# Patient Record
Sex: MALE | Race: WHITE | URBAN - METROPOLITAN AREA
[De-identification: names, ages, dates, MRNs, and addresses within clinical notes are randomized per-mention and may not be internally consistent; named-entity substitution may affect disease eponyms.]

---

## 2017-01-19 ENCOUNTER — HOSPITAL ENCOUNTER (OUTPATIENT)
Dept: CT IMAGING | Facility: CLINIC | Age: 57
Discharge: HOME | End: 2017-01-19

## 2017-01-19 DIAGNOSIS — C34.92 METASTATIC LUNG CANCER (METASTASIS FROM LUNG TO OTHER SITE), LEFT (H): ICD-10-CM

## 2017-02-09 ENCOUNTER — OFFICE VISIT - HEALTHEAST (OUTPATIENT)
Dept: ONCOLOGY | Facility: HOSPITAL | Age: 57
End: 2017-02-09

## 2017-02-09 ENCOUNTER — AMBULATORY - HEALTHEAST (OUTPATIENT)
Dept: INFUSION THERAPY | Facility: HOSPITAL | Age: 57
End: 2017-02-09

## 2017-02-09 DIAGNOSIS — C34.92 METASTATIC LUNG CANCER (METASTASIS FROM LUNG TO OTHER SITE), LEFT (H): ICD-10-CM

## 2017-08-01 ENCOUNTER — HOSPITAL ENCOUNTER (OUTPATIENT)
Dept: CT IMAGING | Facility: CLINIC | Age: 57
Discharge: HOME | End: 2017-08-01
Attending: INTERNAL MEDICINE

## 2017-08-01 DIAGNOSIS — C34.92 METASTATIC LUNG CANCER (METASTASIS FROM LUNG TO OTHER SITE), LEFT (H): ICD-10-CM

## 2017-08-07 ENCOUNTER — OFFICE VISIT - HEALTHEAST (OUTPATIENT)
Dept: ONCOLOGY | Facility: HOSPITAL | Age: 57
End: 2017-08-07

## 2017-08-07 ENCOUNTER — RECORDS - HEALTHEAST (OUTPATIENT)
Dept: ADMINISTRATIVE | Facility: OTHER | Age: 57
End: 2017-08-07

## 2017-08-07 DIAGNOSIS — C34.92 METASTATIC LUNG CANCER (METASTASIS FROM LUNG TO OTHER SITE), LEFT (H): ICD-10-CM

## 2017-08-07 RX ORDER — ALBUTEROL SULFATE 90 UG/1
2 AEROSOL, METERED RESPIRATORY (INHALATION) 4 TIMES DAILY
Status: SHIPPED | COMMUNITY
Start: 2017-08-07

## 2017-08-07 RX ORDER — DULOXETIN HYDROCHLORIDE 60 MG/1
60 CAPSULE, DELAYED RELEASE ORAL DAILY
Status: SHIPPED | COMMUNITY
Start: 2017-08-07

## 2018-01-29 ENCOUNTER — HOSPITAL ENCOUNTER (OUTPATIENT)
Dept: CT IMAGING | Facility: HOSPITAL | Age: 58
Setting detail: RADIATION/ONCOLOGY SERIES
Discharge: STILL ADMITTED OR ADMINISTRATIVE | End: 2018-01-29
Attending: INTERNAL MEDICINE

## 2018-01-29 ENCOUNTER — HOSPITAL ENCOUNTER (OUTPATIENT)
Dept: CT IMAGING | Facility: CLINIC | Age: 58
Discharge: HOME | End: 2018-01-29
Attending: INTERNAL MEDICINE

## 2018-01-29 DIAGNOSIS — C34.92 METASTATIC LUNG CANCER (METASTASIS FROM LUNG TO OTHER SITE), LEFT (H): ICD-10-CM

## 2018-02-02 ENCOUNTER — RECORDS - HEALTHEAST (OUTPATIENT)
Dept: LAB | Facility: CLINIC | Age: 58
End: 2018-02-02

## 2018-02-02 ENCOUNTER — RECORDS - HEALTHEAST (OUTPATIENT)
Dept: ADMINISTRATIVE | Facility: OTHER | Age: 58
End: 2018-02-02

## 2018-02-02 ENCOUNTER — OFFICE VISIT - HEALTHEAST (OUTPATIENT)
Dept: ONCOLOGY | Facility: HOSPITAL | Age: 58
End: 2018-02-02

## 2018-02-02 DIAGNOSIS — C34.92 PRIMARY MALIGNANT NEOPLASM OF LEFT LUNG METASTATIC TO OTHER SITE (H): ICD-10-CM

## 2018-02-02 LAB
BASOPHILS # BLD AUTO: 0 THOU/UL (ref 0–0.2)
BASOPHILS NFR BLD AUTO: 0 % (ref 0–2)
EOSINOPHIL # BLD AUTO: 0 THOU/UL (ref 0–0.4)
EOSINOPHIL NFR BLD AUTO: 0 % (ref 0–6)
ERYTHROCYTE [DISTWIDTH] IN BLOOD BY AUTOMATED COUNT: 13.8 % (ref 11–14.5)
HCT VFR BLD AUTO: 37.2 % (ref 40–54)
HGB BLD-MCNC: 12.6 G/DL (ref 14–18)
LYMPHOCYTES # BLD AUTO: 1 THOU/UL (ref 0.8–4.4)
LYMPHOCYTES NFR BLD AUTO: 11 % (ref 20–40)
MCH RBC QN AUTO: 30.2 PG (ref 27–34)
MCHC RBC AUTO-ENTMCNC: 33.9 G/DL (ref 32–36)
MCV RBC AUTO: 89 FL (ref 80–100)
MONOCYTES # BLD AUTO: 0.7 THOU/UL (ref 0–0.9)
MONOCYTES NFR BLD AUTO: 8 % (ref 2–10)
NEUTROPHILS # BLD AUTO: 7.3 THOU/UL (ref 2–7.7)
NEUTROPHILS NFR BLD AUTO: 81 % (ref 50–70)
PLATELET # BLD AUTO: 248 THOU/UL (ref 140–440)
PMV BLD AUTO: 10.2 FL (ref 8.5–12.5)
RBC # BLD AUTO: 4.17 MILL/UL (ref 4.4–6.2)
WBC: 9 THOU/UL (ref 4–11)

## 2018-08-09 ENCOUNTER — OFFICE VISIT - HEALTHEAST (OUTPATIENT)
Dept: ONCOLOGY | Facility: HOSPITAL | Age: 58
End: 2018-08-09

## 2018-08-09 ENCOUNTER — RECORDS - HEALTHEAST (OUTPATIENT)
Dept: ADMINISTRATIVE | Facility: OTHER | Age: 58
End: 2018-08-09

## 2018-08-09 DIAGNOSIS — C34.92 PRIMARY MALIGNANT NEOPLASM OF LEFT LUNG METASTATIC TO OTHER SITE (H): ICD-10-CM

## 2018-10-10 ENCOUNTER — RECORDS - HEALTHEAST (OUTPATIENT)
Dept: ADMINISTRATIVE | Facility: OTHER | Age: 58
End: 2018-10-10

## 2018-10-11 ENCOUNTER — HOSPITAL ENCOUNTER (OUTPATIENT)
Dept: CT IMAGING | Facility: CLINIC | Age: 58
Discharge: HOME | End: 2018-10-11

## 2018-10-11 DIAGNOSIS — Z85.118 HISTORY OF LUNG CANCER: ICD-10-CM

## 2018-10-16 ENCOUNTER — AMBULATORY - HEALTHEAST (OUTPATIENT)
Dept: ONCOLOGY | Facility: HOSPITAL | Age: 58
End: 2018-10-16

## 2018-10-16 DIAGNOSIS — R91.8 RIGHT LOWER LOBE LUNG MASS: ICD-10-CM

## 2018-10-26 ENCOUNTER — HOSPITAL ENCOUNTER (OUTPATIENT)
Dept: PET IMAGING | Facility: HOSPITAL | Age: 58
Setting detail: RADIATION/ONCOLOGY SERIES
Discharge: STILL ADMITTED OR ADMINISTRATIVE | End: 2018-10-26
Attending: INTERNAL MEDICINE

## 2018-10-26 DIAGNOSIS — R91.8 RIGHT LOWER LOBE LUNG MASS: ICD-10-CM

## 2018-10-26 LAB — GLUCOSE BLDC GLUCOMTR-MCNC: 109 MG/DL

## 2018-10-26 ASSESSMENT — MIFFLIN-ST. JEOR: SCORE: 1598.24

## 2018-12-12 ENCOUNTER — RECORDS - HEALTHEAST (OUTPATIENT)
Dept: ADMINISTRATIVE | Facility: OTHER | Age: 58
End: 2018-12-12

## 2018-12-21 ENCOUNTER — HOSPITAL ENCOUNTER (OUTPATIENT)
Dept: CT IMAGING | Facility: CLINIC | Age: 58
Discharge: HOME | End: 2018-12-21

## 2018-12-21 DIAGNOSIS — C34.92 PRIMARY MALIGNANT NEOPLASM OF LEFT LUNG METASTATIC TO OTHER SITE (H): ICD-10-CM

## 2018-12-26 ENCOUNTER — AMBULATORY - HEALTHEAST (OUTPATIENT)
Dept: ONCOLOGY | Facility: HOSPITAL | Age: 58
End: 2018-12-26

## 2019-02-08 ENCOUNTER — OFFICE VISIT - HEALTHEAST (OUTPATIENT)
Dept: ONCOLOGY | Facility: HOSPITAL | Age: 59
End: 2019-02-08

## 2019-02-08 ENCOUNTER — AMBULATORY - HEALTHEAST (OUTPATIENT)
Dept: INFUSION THERAPY | Facility: HOSPITAL | Age: 59
End: 2019-02-08

## 2019-02-08 ENCOUNTER — RECORDS - HEALTHEAST (OUTPATIENT)
Dept: ADMINISTRATIVE | Facility: OTHER | Age: 59
End: 2019-02-08

## 2019-02-08 DIAGNOSIS — C34.92 PRIMARY MALIGNANT NEOPLASM OF LEFT LUNG METASTATIC TO OTHER SITE (H): ICD-10-CM

## 2019-02-08 DIAGNOSIS — C34.91 PRIMARY MALIGNANT NEOPLASM OF RIGHT LUNG METASTATIC TO OTHER SITE (H): ICD-10-CM

## 2019-02-08 LAB
ALBUMIN SERPL-MCNC: 3.8 G/DL (ref 3.5–5)
ALP SERPL-CCNC: 98 U/L (ref 45–120)
ALT SERPL W P-5'-P-CCNC: 26 U/L (ref 0–45)
ANION GAP SERPL CALCULATED.3IONS-SCNC: 5 MMOL/L (ref 5–18)
AST SERPL W P-5'-P-CCNC: 18 U/L (ref 0–40)
BASOPHILS # BLD AUTO: 0 THOU/UL (ref 0–0.2)
BASOPHILS NFR BLD AUTO: 1 % (ref 0–2)
BILIRUB SERPL-MCNC: 0.4 MG/DL (ref 0–1)
BUN SERPL-MCNC: 14 MG/DL (ref 8–22)
CALCIUM SERPL-MCNC: 9.5 MG/DL (ref 8.5–10.5)
CHLORIDE BLD-SCNC: 103 MMOL/L (ref 98–107)
CO2 SERPL-SCNC: 30 MMOL/L (ref 22–31)
CREAT SERPL-MCNC: 0.81 MG/DL (ref 0.7–1.3)
EOSINOPHIL # BLD AUTO: 0.1 THOU/UL (ref 0–0.4)
EOSINOPHIL NFR BLD AUTO: 2 % (ref 0–6)
ERYTHROCYTE [DISTWIDTH] IN BLOOD BY AUTOMATED COUNT: 15.2 % (ref 11–14.5)
GFR SERPL CREATININE-BSD FRML MDRD: >60 ML/MIN/1.73M2
GLUCOSE BLD-MCNC: 82 MG/DL (ref 70–125)
HCT VFR BLD AUTO: 43.1 % (ref 40–54)
HGB BLD-MCNC: 14.6 G/DL (ref 14–18)
LYMPHOCYTES # BLD AUTO: 1.2 THOU/UL (ref 0.8–4.4)
LYMPHOCYTES NFR BLD AUTO: 25 % (ref 20–40)
MCH RBC QN AUTO: 29.2 PG (ref 27–34)
MCHC RBC AUTO-ENTMCNC: 33.9 G/DL (ref 32–36)
MCV RBC AUTO: 86 FL (ref 80–100)
MONOCYTES # BLD AUTO: 0.6 THOU/UL (ref 0–0.9)
MONOCYTES NFR BLD AUTO: 13 % (ref 2–10)
NEUTROPHILS # BLD AUTO: 2.7 THOU/UL (ref 2–7.7)
NEUTROPHILS NFR BLD AUTO: 59 % (ref 50–70)
PLATELET # BLD AUTO: 213 THOU/UL (ref 140–440)
PMV BLD AUTO: 9.4 FL (ref 8.5–12.5)
POTASSIUM BLD-SCNC: 4.2 MMOL/L (ref 3.5–5)
PROT SERPL-MCNC: 6.5 G/DL (ref 6–8)
RBC # BLD AUTO: 5 MILL/UL (ref 4.4–6.2)
SODIUM SERPL-SCNC: 138 MMOL/L (ref 136–145)
WBC: 4.7 THOU/UL (ref 4–11)

## 2019-02-08 RX ORDER — NAPROXEN 500 MG/1
500 TABLET ORAL 2 TIMES DAILY PRN
Status: SHIPPED | COMMUNITY
Start: 2019-02-08

## 2019-08-27 ENCOUNTER — AMBULATORY - HEALTHEAST (OUTPATIENT)
Dept: INFUSION THERAPY | Facility: HOSPITAL | Age: 59
End: 2019-08-27

## 2019-08-27 ENCOUNTER — OFFICE VISIT - HEALTHEAST (OUTPATIENT)
Dept: ONCOLOGY | Facility: HOSPITAL | Age: 59
End: 2019-08-27

## 2019-08-27 DIAGNOSIS — C34.91 PRIMARY MALIGNANT NEOPLASM OF RIGHT LUNG METASTATIC TO OTHER SITE (H): ICD-10-CM

## 2019-08-27 RX ORDER — LEVALBUTEROL TARTRATE 45 UG/1
2 AEROSOL, METERED ORAL EVERY 6 HOURS PRN
Status: SHIPPED | COMMUNITY
Start: 2019-08-27

## 2019-08-27 RX ORDER — DULOXETIN HYDROCHLORIDE 30 MG/1
30 CAPSULE, DELAYED RELEASE ORAL DAILY
Status: SHIPPED | COMMUNITY
Start: 2019-08-27

## 2019-08-27 ASSESSMENT — MIFFLIN-ST. JEOR: SCORE: 1698.04

## 2019-11-19 ENCOUNTER — RECORDS - HEALTHEAST (OUTPATIENT)
Dept: ADMINISTRATIVE | Facility: OTHER | Age: 59
End: 2019-11-19

## 2019-12-20 ENCOUNTER — HOSPITAL ENCOUNTER (OUTPATIENT)
Dept: CT IMAGING | Facility: CLINIC | Age: 59
Discharge: HOME | End: 2019-12-20

## 2019-12-20 DIAGNOSIS — Z85.118 HISTORY OF LUNG CANCER: ICD-10-CM

## 2019-12-20 LAB
CREAT BLD-MCNC: 0.9 MG/DL (ref 0.7–1.3)
GFR SERPL CREATININE-BSD FRML MDRD: >60 ML/MIN/1.73M2

## 2020-01-14 ENCOUNTER — COMMUNICATION - HEALTHEAST (OUTPATIENT)
Dept: ONCOLOGY | Facility: HOSPITAL | Age: 60
End: 2020-01-14

## 2021-05-30 VITALS — BODY MASS INDEX: 24.97 KG/M2 | WEIGHT: 179 LBS

## 2021-05-31 VITALS — BODY MASS INDEX: 24.27 KG/M2 | WEIGHT: 174 LBS

## 2021-05-31 VITALS — WEIGHT: 177 LBS | BODY MASS INDEX: 24.69 KG/M2

## 2021-05-31 NOTE — PROGRESS NOTES
[facility] Hematology and Oncology Progress Note    Patient: Clinton S Weilage  MRN: [id number]  Date of Service: 08/27/2019        Reason for Visit    Chief Complaint   Patient presents with     HE Cancer     Primary malignant neoplasm of left lung metastatic to other site       Assessment and Plan  Cancer Staging  Metastatic lung cancer (metastasis from lung to other site) (H)  Staging form: Lung, AJCC 7th Edition  - Clinical: Stage IV (TX, NX, M1b) - Signed by Mariely Garcia MD on 5/21/2014      1. 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  Overall clinically patient is doing quite well and has no complaints.  He will return in December with another CT scan.  I told him he will need to continue annual scans because of his lung cancer history as well as his smoking history.  He is getting out of prison in January and is not sure where he will be living so I did tell him that if he is not living close to our clinic we can transfer his care and send records to anywhere he needs us     2.  COPD: He needs to use inhalers and nebulizers as needed.  They have recently been changed.  Continue to follow up at the [facility] doctor for any recommendations      ECOG Performance   ECOG Performance Status: 0     Distress Assessment  Distress Assessment Score: No distress    Pain  Currently in Pain: No/denies      Problem List    1. Primary malignant neoplasm of right lung metastatic to other site (H)  CT Chest Abdomen Pelvis With Oral With IV Cont      ______________________________________________________________________________    History of Present Illness    Measurable disease: CT of the chest        Current therapy: Observation      Treatment history:Patient diagnosed in February 2014    Initial treatment with 2 cycles of carboplatin and etoposide starting March 18, 2014     He then received concurrent radiation and weekly carboplatin and Taxol which was completed June 9, 2014      History: Patient is here today for 6 month follow-up visit.  He is doing fine. Some breathing issues, which is not new. Some of his copd meds have been changed.       Pain Status  Currently in Pain: No/denies    Review of Systems    Constitutional  Constitutional (WDL): All constitutional elements are within defined limits  Fatigue: No Concerns  Fever: No Concerns  Chills: No Concerns  Weight Gain: No Concerns  Weight Loss: No Concerns  Hot flashes/Night Sweats: No Concerns  Neurosensory  Neurosensory (WDL): Exceptions to WDL  Peripheral Motor Neuropathy: Concerns(feet)  Ataxia: No Concerns  Peripheral Sensory Neuropathy: No Concerns  Confusion: No Concerns  Dizziness: No Concerns  Syncope: No Concerns  Eye   Eye Disorder (WDL): All eye disorder elements are within defined limits(glasses)  Blurred Vision: No Concerns  Dry Eye: No Concerns  Eye Pain: No Concerns  Watering Eyes: No Concerns  Ear  Ear Disorder (WDL): All ear disorder elements are within defined limits  Ear Pain: No Concerns  Tinnitus: No Concerns  Cardiovascular  Cardiovascular (WDL): All cardiovascular elements are within defined limits  Palpitations: No Concerns  Edema: No Concerns  SVT, DVT/PE: No Concerns  Chest Pain - Cardiac: No Concerns  Pulmonary  Respiratory (WDL): Within Defined Limits  Cough: No Concerns  Dyspnea: No Concerns  Hypoxia: No Concerns  Gastrointestinal  Gastrointestinal (WDL): All gastrointestinal elements are within defined limits  Anorexia: No Concerns  Nausea: No Concerns  Vomiting: No Concerns  Dehydration: No Concerns  Dysgeusia: No Concerns  Dysphagia: No Concerns  Mucositis Oral: No Concerns  Esophagitis: No Concerns  Constipation: No Concerns  Diarrhea: No Concerns  Pharyngitis: No Concerns  Dry Mouth: No Concerns  Genitourinary  Genitourinary (WDL): All genitourinary elements are within defined limits  Urinary  "Frequency: No Concerns  Urinary Retention: No Concerns  Urinary Tract Pain: No Concerns  Lymphatic  Lymph (WDL): All lymph disorder elements are within defined limits  Lymphedema: No Concerns  Lymph Node Discomfort: No Concerns  Musculoskeletal and Connective Tissue  Musculoskeletal and Connetive Tissue Disorders (WDL): All Musculoskeletal and Connetive Tissue Disorder elements are within defined limits  Arthralgia: No Concerns  Bone Pain: No Concerns  Muscle Weakness : No Concerns  Myalgia: No Concerns  Integumentary  Integumentary (WDL): All integumentary elements are within defined limits  Alopecia: No Concerns  Rash Maculo-Papular: No Concerns  Pruritus: No Concerns  Urticaria: No Concerns  Palmar-Plantar Erythrodysesthesia Syndrome: No Concerns  Flushing: No Concerns  Patient Coping  Patient Coping: Accepting;Open/discussion  Accompanied by  Accompanied by: Law Enforcment  Oral Chemo Adherence         Past History  Past Medical History:   Diagnosis Date     Anemia      COPD (chronic obstructive pulmonary disease) (H) \"for years\"     Coronary artery disease     states \"I have a couple blockages, but they are not doing anything about that until this cancer stuff is taken care of\"     GERD (gastroesophageal reflux disease) \"10-15 years\"     Hypertension \"many years\"     Lung cancer (H) 1/2014    \"spots on my adrenal and liver\"     Metastatic cancer (H)      Pancreatic cancer (H)     \"States there may have been a spot on my pancreas too\"     Substance abuse (H) no use X 10 years    drug use was marijuana       PHYSICAL EXAM:  /71   Pulse 78   Ht 5' 11\" (1.803 m)   Wt 192 lb (87.1 kg)   SpO2 95%   BMI 26.78 kg/m    GENERAL: no acute distress. Cooperative in conversation. Here with 2 guards from [employer]. No weight loss.   HEENT: pupils are equal, round and reactive. Oromucosa is clean and intact. No ulcerations or mucositis noted. No bleeding noted.  RESP: lungs are diminished bilaterally per auscultation. " Regular respiratory rate. No wheezes or rhonchi.  CV: Regular, rate and rhythm. No murmurs.  ABD: soft, nontender. Positive bowel sounds. No organomegaly.   MUSCULOSKELETAL: No lower extremity swelling.   NEURO: non focal. Alert and oriented x3.   PSYCH: within normal limits. No depression or anxiety.  SKIN: warm dry intact   LYMPH: no cervical, supraclavicular lymphadenopathy    Lab Results    No results found for this or any previous visit (from the past 168 hour(s)).    Imaging    No results found.      Signed by: Deborah Sands, CNP

## 2021-05-31 NOTE — PROGRESS NOTES
Patient is here for labs and provider for Primary malignant neoplasm of left lung metastatic to other site.

## 2021-06-01 VITALS — BODY MASS INDEX: 24.55 KG/M2 | WEIGHT: 176 LBS

## 2021-06-02 VITALS — WEIGHT: 170 LBS | BODY MASS INDEX: 23.8 KG/M2 | HEIGHT: 71 IN

## 2021-06-02 VITALS — WEIGHT: 189 LBS | BODY MASS INDEX: 26.36 KG/M2

## 2021-06-03 VITALS — WEIGHT: 192 LBS | HEIGHT: 71 IN | BODY MASS INDEX: 26.88 KG/M2

## 2021-06-05 ENCOUNTER — RECORDS - HEALTHEAST (OUTPATIENT)
Dept: SCHEDULING | Facility: CLINIC | Age: 61
End: 2021-06-05

## 2021-06-05 ENCOUNTER — RECORDS - HEALTHEAST (OUTPATIENT)
Dept: ONCOLOGY | Facility: HOSPITAL | Age: 61
End: 2021-06-05

## 2021-06-05 DIAGNOSIS — C34.90 MALIGNANT NEOPLASM OF BRONCHUS AND LUNG (H): ICD-10-CM

## 2021-06-05 DIAGNOSIS — C80.1 OTHER MALIGNANT NEOPLASM OF UNSPECIFIED SITE: ICD-10-CM

## 2021-06-05 DIAGNOSIS — R13.10 DYSPHAGIA: ICD-10-CM

## 2021-06-05 NOTE — TELEPHONE ENCOUNTER
Clinton called today to inform us he is released from [facility] on 1/9/20. He requested to cancel his appointmemt with Dr Vasquez on 1/16/20.   He is going to [place] and will be scheduling his appointments up there.   He wishes to follow DR vasquez for future care.  He will call himself when he figure his situation out.

## 2021-06-05 NOTE — TELEPHONE ENCOUNTER
Referrals department from [facility] in the [place], [place] called in and left a message on the nurse triage line regarding this patient.  This patient is currently a [employer] patient and will have to deal directly with the [employer] for information on this patient.  I called the clinic back and got a voicemail so I did leave a detailed voicemail stating that they will need to contact the [facility] directly and that he is at the [facility] correctional facility.    Margret Bonilla RN

## 2021-06-08 NOTE — PROGRESS NOTES
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  No other evidence of cancer recurrence or progression.  He is 3 years out from diagnosis.  We will continue with observation and follow-up in 6 months with repeat CT of the chest.    COPD and neuropathy are stable.    Other pulmonary nodules have resolved.    Plan: Follow-up in 6 months with repeat CT of the chest    Measurable disease: CT of the chest       Current therapy: Observation        Treatment history:Patient diagnosed in February 2014    Initial treatment with 2 cycles of carboplatin and etoposide starting March 18, 2014    He then received concurrent radiation and weekly carboplatin and Taxol which was completed June 9, 2014      Metastatic lung cancer (metastasis from lung to other site)    Staging form: Lung, AJCC 7th Edition      Clinical: Stage IV (TX, NX, M1b) - Signed by Mariely Garcia MD on 5/21/2014    ECOG Performance   ECOG Performance Status: 1    Distress Assessment       Pain  Pain Score (Initial OR Reassessment): 3        Problem List    1. Metastatic lung cancer (metastasis from lung to other site), left  CT Chest With Contrast        CC: Prosper Argueta MD    ______________________________________________________________________________    History of Present Illness    Mr. Clinton S Weilage is here for reevaluation.  He was seen 3 months ago.  He steers out now from his diagnosis.  He denies any headaches dizziness or focal weakness.  He does have chronic shortness of breath with some pain on deep inspiration.  No fever or mouth  "sores.  No change in his cough currently.  No new bone or abdominal pain.  Appetite and weight are stable.  No change in bowels or urine.  No bleeding or rash.  ECOG status is 0.    Pain Status  Currently in Pain: Yes    Review of Systems    Constitutional  Constitutional (WDL): Exceptions to WDL  Fatigue: Fatigue relieved by rest  Neurosensory  Neurosensory (WDL): Exceptions to WDL  Peripheral Sensory Neuropathy: Asymptomatic, loss of deep tendon reflexes or paresthesia ( Feet; feels worse. Reports that it feels constricting. )  Cardiovascular  Cardiovascular (WDL): All cardiovascular elements are within defined limits  Pulmonary  Respiratory (WDL): Exceptions to WDL  Dyspnea: Shortness of breath with moderate exertion  Gastrointestinal  Gastrointestinal (WDL): Exceptions to WDL  Anorexia: Loss of appetite without alteration in eating habits  Genitourinary  Genitourinary (WDL): All genitourinary elements are within defined limits  Integumentary  Integumentary (WDL): All integumentary elements are within defined limits  Patient Coping  Patient Coping: Accepting  Distress Assessment     Accompanied by  Accompanied by: Law Enforcment    Past History  Past Medical History:   Diagnosis Date     Anemia      COPD (chronic obstructive pulmonary disease) \"for years\"     Coronary artery disease     states \"I have a couple blockages, but they are not doing anything about that until this cancer stuff is taken care of\"     GERD (gastroesophageal reflux disease) \"10-15 years\"     Hypertension \"many years\"     Lung cancer 1/2014    \"spots on my adrenal and liver\"     Metastatic cancer      Pancreatic cancer     \"States there may have been a spot on my pancreas too\"     Substance abuse no use X 10 years    drug use was marijuana         Past Surgical History:   Procedure Laterality Date     HAND SURGERY Bilateral 10-12 yeasrs ago    bones removed and tendon transfer for arthritis     KNEE ARTHROSCOPY Left 20 years ago    \"took " "cartilidge out\"     LUNG SURGERY  biopsy in 3/2014       Physical Exam    Recent Vitals 2/9/2017   Height -   Weight 179 lbs   BSA (m2) -   /73   Pulse 85   Temp 97.9   Temp src 1   SpO2 98       GENERAL: Alert and oriented. Seated comfortably. In no distress.    HEAD: Atraumatic and normocephalic.  Has a full head of hair.    EYES: ANA, EOMI.  No pallor.  No icterus.    Oral cavity: no mucosal lesion or tonsillar enlargement.    NECK: supple. JVP normal.  No thyroid enlargement.    LYMPH NODES: No palpable, cervical, axillary or inguinal lymphadenopathy.    CHEST: Decreased breath sounds bilaterally.  Resonant to percussion throughout bilaterally.  Symmetrical breath movements bilaterally.    CVS: S1 and S2 are heard. Regular rate and rhythm.  No murmur or gallop or rub heard.    ABDOMEN: Soft. Not tender. Not distended.  No palpable hepatomegaly or splenomegaly.  No other mass palpable.  Bowel sounds heard.    EXTREMITIES: Warm.  No peripheral edema.    SKIN: no rash, or bruising or purpura.    CNS: Nonfocal        Lab Results    No results found for this or any previous visit (from the past 168 hour(s)).    Imaging    Ct Chest With Contrast    Result Date: 1/19/2017  CT CHEST W CONTRAST 1/19/2017 11:32 AM INDICATION: Malignant neoplasm of unspecified part of left bronchus or lung. TECHNIQUE: Routine chest. Dose reduction techniques were used. IV CONTRAST: 80 mL Omni 350. COMPARISON: 11/8/2016. FINDINGS: LUNGS AND PLEURA: Severe emphysema. A mass is again seen in the left lower lobe on series 3, image 63, measuring 1 x 1.8 cm , stable to minimally decreased in size compared to the prior exam. There is fibrosis in the surrounding area of the left lower lobe, likely related to radiation therapy. No new pulmonary nodules are identified. Small nodules previously seen in the posterior basilar segment of the left lower lobe have resolved, consistent with inflammatory process. The pleural spaces appear normal. " MEDIASTINUM: No lymphadenopathy. Calcified atherosclerotic plaque in the coronary arteries. LIMITED UPPER ABDOMEN: Benign cyst in the lower pole of the left kidney. Calcified atherosclerotic plaque in the proximal abdominal aorta. MUSCULOSKELETAL: Negative.     CONCLUSION: 1.  Left lower lobe mass appears stable to minimally decreased in size with surrounding infrahilar fibrosis. 2.  Interval resolution of small pulmonary nodules, resolution of inflammatory process.        Signed by: Mariely Garcia MD

## 2021-06-12 NOTE — PROGRESS NOTES
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  No new lesions are noted.  Patient remains in good remission and he is reassured.  Would recommend continued six-month follow-up with repeat CT of the chest.  Patient is more than 3 3 years out after completion of all treatment.    He continues on treatment for COPD and neuropathy and this is stable.      Plan: Follow-up in 6 months with repeat CT of the chest    Measurable disease: CT of the chest       Current therapy: Observation        Treatment history:Patient diagnosed in February 2014    Initial treatment with 2 cycles of carboplatin and etoposide starting March 18, 2014    He then received concurrent radiation and weekly carboplatin and Taxol which was completed June 9, 2014      Metastatic lung cancer (metastasis from lung to other site)    Staging form: Lung, AJCC 7th Edition      Clinical: Stage IV (TX, NX, M1b) - Signed by Mariely Garcia MD on 5/21/2014    ECOG Performance   ECOG Performance Status: 1    Distress Assessment  Distress Assessment Score: No distress    Pain           Problem List    1. Metastatic lung cancer (metastasis from lung to other site), left  CT Chest With Contrast        CC: Prosper Argueta MD    ______________________________________________________________________________    History of Present Illness    Mr. Clinton S Weilage is here for reevaluation.  He was seen 6 months ago.  Continues to have neuropathy symptoms.  Is on nebulizers and inhalers for his COPD.  No headaches or  "dizziness.  No shortness of breath or cough.  No new bone or abdominal pain.  Appetite and weight are stable.  ECOG status is 0.  No other new symptoms or problems.    Pain Status  Currently in Pain: No/denies    Review of Systems    Constitutional  Constitutional (WDL): Exceptions to WDL  Fatigue: Fatigue relieved by rest  Neurosensory  Neurosensory (WDL): Exceptions to WDL  Peripheral Sensory Neuropathy: Moderate symptoms, limiting instrumental ADL (Feet; feels worse. Reports that it feels constricting. On Cymbalta. )  Cardiovascular  Cardiovascular (WDL): All cardiovascular elements are within defined limits  Pulmonary  Respiratory (WDL): Exceptions to WDL  Dyspnea: Shortness of breath with moderate exertion  Gastrointestinal  Gastrointestinal (WDL): Exceptions to WDL  Anorexia: Loss of appetite without alteration in eating habits  Genitourinary  Genitourinary (WDL): All genitourinary elements are within defined limits  Integumentary  Integumentary (WDL): All integumentary elements are within defined limits  Patient Coping  Patient Coping: Accepting  Distress Assessment  Distress Assessment Score: No distress  Accompanied by  Accompanied by: Law Enforcment    Past History  Past Medical History:   Diagnosis Date     Anemia      COPD (chronic obstructive pulmonary disease) \"for years\"     Coronary artery disease     states \"I have a couple blockages, but they are not doing anything about that until this cancer stuff is taken care of\"     GERD (gastroesophageal reflux disease) \"10-15 years\"     Hypertension \"many years\"     Lung cancer 1/2014    \"spots on my adrenal and liver\"     Metastatic cancer      Pancreatic cancer     \"States there may have been a spot on my pancreas too\"     Substance abuse no use X 10 years    drug use was marijuana         Past Surgical History:   Procedure Laterality Date     HAND SURGERY Bilateral 10-12 yeasrs ago    bones removed and tendon transfer for arthritis     KNEE ARTHROSCOPY " "Left 20 years ago    \"took cartilidge out\"     LUNG SURGERY  biopsy in 3/2014       Physical Exam    Recent Vitals 8/7/2017   Height -   Weight 174 lbs   BSA (m2) -   /81   Pulse 81   Temp 98.7   Temp src 1   SpO2 97   Some recent data might be hidden       GENERAL: Alert and oriented. Seated comfortably. In no distress.    HEAD: Atraumatic and normocephalic.  Has a full head of hair.    EYES: ANA, EOMI.  No pallor.  No icterus.    Oral cavity: no mucosal lesion or tonsillar enlargement.    NECK: supple. JVP normal.  No thyroid enlargement.    LYMPH NODES: No palpable, cervical, axillary or inguinal lymphadenopathy.    CHEST: Decreased breath sounds bilaterally.  Resonant to percussion throughout bilaterally.  Symmetrical breath movements bilaterally.    CVS: S1 and S2 are heard. Regular rate and rhythm.  No murmur or gallop or rub heard.    ABDOMEN: Soft. Not tender. Not distended.  No palpable hepatomegaly or splenomegaly.  No other mass palpable.  Bowel sounds heard.    EXTREMITIES: Warm.  No peripheral edema.    SKIN: no rash, or bruising or purpura.    CNS: Nonfocal        Lab Results    No results found for this or any previous visit (from the past 168 hour(s)).    Imaging    Ct Chest With Contrast    Result Date: 8/1/2017  CT CHEST W CONTRAST 8/1/2017 11:14 AM INDICATION: fu lung cancer TECHNIQUE: Routine chest. Dose reduction techniques were used. IV CONTRAST: Iohexol (Omni) 100 mL COMPARISON: 1/19/2017 and 11/8/2016. FINDINGS: LUNGS AND PLEURA: Decreased size left lower lobe mass measures 1.5 x 0.7 cm (series 3, image 70) compared to 1.8 x 1 cm previously. Stable left upper lobe 2 mm nodule (image 49). No effusion. Emphysema. MEDIASTINUM: No adenopathy. Coronary atherosclerosis. LIMITED UPPER ABDOMEN: Tiny left renal cyst. MUSCULOSKELETAL: No metastases seen.     CONCLUSION: 1.  Decreased size left lower lobe mass. 2.  Stable left upper lobe 2 mm nodule. 3.  Coronary atherosclerosis.        Signed " by: Mariely Garcia MD

## 2021-06-15 NOTE — PROGRESS NOTES
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  No other clinical signs of progression either.  Reassured patient.  He is 4 years out now from the time of diagnosis.  We will see him back again in 6 months for recheck.  Will plan repeat CT scan in about 1 year.    Plan: As above      Measurable disease: CT of the chest       Current therapy: Observation        Treatment history:Patient diagnosed in February 2014    Initial treatment with 2 cycles of carboplatin and etoposide starting March 18, 2014    He then received concurrent radiation and weekly carboplatin and Taxol which was completed June 9, 2014      Metastatic lung cancer (metastasis from lung to other site)    Staging form: Lung, AJCC 7th Edition      Clinical: Stage IV (TX, NX, M1b) - Signed by Mariely Garcia MD on 5/21/2014    ECOG Performance   ECOG Performance Status: 1    Distress Assessment  Distress Assessment Score: 2    Pain           Problem List    1. Primary malignant neoplasm of left lung metastatic to other site          CC: Prosper Argueta MD    ______________________________________________________________________________    History of Present Illness    Mr. Clinton S Weilage is here for reevaluation.  He was seen about 6 months ago.  Currently being treated for bronchitis.  No headaches or dizziness.  No new bone or abdominal pain.  Appetite and weight are stable.  ECOG status 0.  Medications reviewed and are unchanged.  No other new symptoms or problems.      Pain Status  Currently in Pain: No/denies    Review of Systems    Constitutional  Constitutional (WDL): Exceptions to  "WDL  Fatigue: Fatigue relieved by rest  Neurosensory  Neurosensory (WDL): Exceptions to WDL  Peripheral Sensory Neuropathy: Asymptomatic, loss of deep tendon reflexes or paresthesia (numbness in feet)  Cardiovascular  Cardiovascular (WDL): All cardiovascular elements are within defined limits  Pulmonary  Respiratory (WDL): Exceptions to WDL  Cough: Mild symptoms, nonprescription intervention indicated  Dyspnea: Shortness of breath with minimal exertion, limiting instrumental ADL  Hypoxia: Decreased oxygen saturation with exercise (e.g., pulse oximeter <88%), intermittent supplemental oxygen  Gastrointestinal  Gastrointestinal (WDL): Exceptions to WDL  Anorexia: Loss of appetite without alteration in eating habits  Genitourinary  Genitourinary (WDL): All genitourinary elements are within defined limits  Integumentary  Integumentary (WDL): All integumentary elements are within defined limits  Patient Coping  Patient Coping: Accepting  Distress Assessment  Distress Assessment Score: 2  Accompanied by  Accompanied by:  Enforcment    Past History  Past Medical History:   Diagnosis Date     Anemia      COPD (chronic obstructive pulmonary disease) \"for years\"     Coronary artery disease     states \"I have a couple blockages, but they are not doing anything about that until this cancer stuff is taken care of\"     GERD (gastroesophageal reflux disease) \"10-15 years\"     Hypertension \"many years\"     Lung cancer 1/2014    \"spots on my adrenal and liver\"     Metastatic cancer      Pancreatic cancer     \"States there may have been a spot on my pancreas too\"     Substance abuse no use X 10 years    drug use was marijuana         Past Surgical History:   Procedure Laterality Date     HAND SURGERY Bilateral 10-12 yeasrs ago    bones removed and tendon transfer for arthritis     KNEE ARTHROSCOPY Left 20 years ago    \"took cartilidge out\"     LUNG SURGERY  biopsy in 3/2014       Physical Exam    Recent Vitals 2/2/2018   Height - "   Weight 177 lbs   BSA (m2) -   /84   Pulse 105   Temp 98.4   Temp src 1   SpO2 95   Some recent data might be hidden       GENERAL: Alert and oriented. Seated comfortably. In no distress.    HEAD: Atraumatic and normocephalic.  Has a full head of hair.    EYES: ANA, EOMI.  No pallor.  No icterus.    Oral cavity: no mucosal lesion or tonsillar enlargement.    NECK: supple. JVP normal.  No thyroid enlargement.    LYMPH NODES: No palpable, cervical, axillary or inguinal lymphadenopathy.    CHEST: Decreased breath sounds bilaterally.  Resonant to percussion throughout bilaterally.  Symmetrical breath movements bilaterally.    CVS: S1 and S2 are heard. Regular rate and rhythm.  No murmur or gallop or rub heard.    ABDOMEN: Soft. Not tender. Not distended.  No palpable hepatomegaly or splenomegaly.  No other mass palpable.  Bowel sounds heard.    EXTREMITIES: Warm.  No peripheral edema.    SKIN: no rash, or bruising or purpura.    CNS: Nonfocal        Lab Results    No results found for this or any previous visit (from the past 168 hour(s)).    Imaging    Ct Chest With Contrast    Result Date: 1/29/2018  CT CHEST W CONTRAST 1/29/2018 1:09 PM INDICATION: fu lung cancer TECHNIQUE: Routine chest. Dose reduction techniques were used. IV CONTRAST: Iohexol (Omni) 90mL COMPARISON: 8/1/2017 FINDINGS: LUNGS AND PLEURA: No change in the post therapeutic appearance of the left infrahilar region with bands of radiating fibrosis. A few endobronchial soft tissue foci likely represent retained mucous secretions. No recurrent mass. Severe emphysema unchanged. MEDIASTINUM: No significant lymphadenopathy. Coronary artery calcification. LIMITED UPPER ABDOMEN: Negative. MUSCULOSKELETAL: Negative.     CONCLUSION: 1.  Stable post therapeutic appearance left lower lobe. No suggestion for local recurrence, adenopathy or thoracic metastases.        Signed by: Mariely Garcia MD

## 2021-06-19 NOTE — PROGRESS NOTES
[facility] Hematology and Oncology Progress Note    Patient: Clinton S Weilage  MRN: [id number]  Date of Service: 08/09/2018        Reason for Visit    Chief Complaint   Patient presents with     HE Cancer     Primary malignant neoplasm of left lung metastatic to other site        Assessment and Plan  Metastatic lung cancer (metastasis from lung to other site) (H)    Staging form: Lung, AJCC 7th Edition    - Clinical: Stage IV (TX, NX, M1b) - Signed by Mariely Garcia MD on 5/21/2014    1. Large cell neuroendocrine lung cancer with possible adrenal mets at presentation: Last CT scan was done about 6 months ago January 2018 and that did not show any signs of progression.  Overall clinically patient is doing quite well and has no complaints.  He will return in 6 months roughly at the end of January and repeat a CT scan.    2.  COPD: He needs to use inhalers and nebulizers as needed.  He states that humidity has been harder on his lungs but otherwise he has not not noticed any worsening of his disease.  Continue to follow up at the [facility] doctor for any recommendations    3.  Mild peripheral neuropathy: This is likely from his previous chemo 4 years ago.  He does take Cymbalta that does help the pain but has not helped the numbness.  He does not have any issues with ADLs at this time.  This is likely indefinite side effect from the chemo.    ECOG Performance   ECOG Performance Status: 0     Distress Assessment  Distress Assessment Score: No distress    Pain  Currently in Pain: No/denies      Problem List    1. Primary malignant neoplasm of left lung metastatic to other site (H)  CT Chest Abdomen Pelvis With Oral With IV Cont    HM1(CBC and Differential)    Comprehensive Metabolic Panel      ______________________________________________________________________________    History of Present Illness    Measurable disease: CT of the chest        Current therapy: Observation      Treatment history:Patient  diagnosed in February 2014    Initial treatment with 2 cycles of carboplatin and etoposide starting March 18, 2014    He then received concurrent radiation and weekly carboplatin and Taxol which was completed June 9, 2014      History: Patient is here today for 6 month follow-up visit.  He states that he has been actually feeling quite good.  His weight is stable.  He denies any new bone or back pain.  He continues to have shortness of breath from his COPD with some wheezing but that is overall stable and he knows what to do about it including inhalers and nebulizers.  He does try to stay out of the humidity in the summer.  Than that he is just excited to be where he is out from his lung cancer standpoint.  He does continue to complain of neuropathy.  He states he was started on Cymbalta to help with the burning stinging pain that happened and that has helped but he does continue to have numbness in his feet.  No symptoms in his hands      Pain Status  Currently in Pain: No/denies    Review of Systems    Constitutional  Constitutional (WDL): All constitutional elements are within defined limits  Neurosensory  Neurosensory (WDL): All neurosensory elements are within defined limits  Eye   Eye Disorder (WDL): All eye disorder elements are within defined limits  Ear  Ear Disorder (WDL): All ear disorder elements are within defined limits  Cardiovascular  Cardiovascular (WDL): Exceptions to WDL  Edema: Yes (feet bilateral)  Pulmonary  Respiratory (WDL): Exceptions to WDL  Cough: Mild symptoms, nonprescription intervention indicated (Chronic)  Dyspnea: Shortness of breath with moderate exertion (chronic)  Gastrointestinal  Gastrointestinal (WDL): All gastrointestinal elements are within defined limits  Genitourinary  Genitourinary (WDL): All genitourinary elements are within defined limits  Lymphatic  Lymph (WDL): All lymph disorder elements are within defined limits  Musculoskeletal and Connective Tissue  Musculoskeletal  "and Connetive Tissue Disorders (WDL): All Musculoskeletal and Connetive Tissue Disorder elements are within defined limits  Integumentary  Integumentary (WDL): All integumentary elements are within defined limits  Patient Coping  Patient Coping: Accepting  Distress Assessment  Distress Assessment Score: No distress  Accompanied by  Accompanied by: Law Enforcment (2 corrections officers)  Oral Chemo Adherence       Past History  Past Medical History:   Diagnosis Date     Anemia      COPD (chronic obstructive pulmonary disease) (H) \"for years\"     Coronary artery disease     states \"I have a couple blockages, but they are not doing anything about that until this cancer stuff is taken care of\"     GERD (gastroesophageal reflux disease) \"10-15 years\"     Hypertension \"many years\"     Lung cancer (H) 1/2014    \"spots on my adrenal and liver\"     Metastatic cancer (H)      Pancreatic cancer (H)     \"States there may have been a spot on my pancreas too\"     Substance abuse no use X 10 years    drug use was marijuana       PHYSICAL EXAM:  BP 97/75  Pulse 84  Temp 98  F (36.7  C) (Oral)   Wt 176 lb (79.8 kg)  SpO2 96%  BMI 24.55 kg/m2  GENERAL: no acute distress. Cooperative in conversation. Here with 2 guards from [facility]  HEENT: pupils are equal, round and reactive. Oromucosa is clean and intact. No ulcerations or mucositis noted. No bleeding noted.  RESP: lungs are diminished bilaterally per auscultation. Regular respiratory rate. No wheezes or rhonchi.  CV: Regular, rate and rhythm. No murmurs.  ABD: soft, nontender. Positive bowel sounds. No organomegaly.   MUSCULOSKELETAL: No lower extremity swelling.   NEURO: non focal. Alert and oriented x3.   PSYCH: within normal limits. No depression or anxiety.  SKIN: warm dry intact   LYMPH: no cervical, supraclavicular lymphadenopathy    Lab Results    No results found for this or any previous visit (from the past 168 hour(s)).    Imaging    No results found.      Signed by: " Deborah Sands, CNP

## 2021-06-21 NOTE — PROGRESS NOTES
Patient with left lung cancer last seen in our clinic August 2018.  Plan was for follow-up in January 2019 with CT scan.  I received report of CT scan done for hemoptysis with findings of a new right lower lobe mass.  Will get PET scan for further evaluation.  Further management based on PET scan results.

## 2021-06-22 NOTE — PROGRESS NOTES
Patient had PET scan in October showing decrease in size of right lung nodule consistent most likely with infection.  Follow-up CT December 21 shows resolution of the nodule.  I think a next CT imaging can be done in 1 year.  We will keep appointment for follow-up in February 2019.

## 2021-06-23 NOTE — PROGRESS NOTES
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  We will continue observation.  We will see patient once more in 6 months.    Explained that he needs to continue with annual CT of the chest for lung cancer screening and this will be due again in December 2019.    Plan: Follow-up in 6 months  Repeat CT of the chest in December 2019    Measurable disease: CT of the chest       Current therapy: Observation        Treatment history:Patient diagnosed in February 2014    Initial treatment with 2 cycles of carboplatin and etoposide starting March 18, 2014    He then received concurrent radiation and weekly carboplatin and Taxol which was completed June 9, 2014      Metastatic lung cancer (metastasis from lung to other site)    Staging form: Lung, AJCC 7th Edition      Clinical: Stage IV (TX, NX, M1b) - Signed by Mariely Garcia MD on 5/21/2014    ECOG Performance   ECOG Performance Status: 1    Distress Assessment       Pain           Problem List    1. Primary malignant neoplasm of right lung metastatic to other site (H)          CC: Prosper Argueta MD    ______________________________________________________________________________    History of Present Illness    Mr. Clinton S Weilage is here for reevaluation.  He was seen back in August.  In October he had symptoms of hemoptysis with initial CT scan showing concern for new right lower lobe lung process.  He was treated with antibiotics and improvement.  Follow-up PET scan showed improvement and a CT in December 2018 showed resolution of the right lung changes and stable findings on the  "left.    He is doing well except for his COPD.  No headaches.  No new bone or abdominal pain.  ECOG status is 1.    Pain Status  Currently in Pain: No/denies    Review of Systems    Constitutional  Constitutional (WDL): All constitutional elements are within defined limits  Neurosensory  Neurosensory (WDL): Exceptions to WDL  Peripheral Sensory Neuropathy: Asymptomatic, loss of deep tendon reflexes or paresthesia(Toes; numbness.)  Cardiovascular  Cardiovascular (WDL): Exceptions to WDL  Edema: Yes(Rt leg. Wearing compression stockings. )  Pulmonary  Respiratory (WDL): Exceptions to WDL(Hx: COPD.)  Cough: Mild symptoms, nonprescription intervention indicated  Dyspnea: Shortness of breath with moderate exertion  Gastrointestinal  Gastrointestinal (WDL): All gastrointestinal elements are within defined limits  Genitourinary  Genitourinary (WDL): All genitourinary elements are within defined limits  Integumentary  Integumentary (WDL): All integumentary elements are within defined limits  Patient Coping  Patient Coping: Accepting  Distress Assessment     Accompanied by  Accompanied by: Law Enforcment    Past History  Past Medical History:   Diagnosis Date     Anemia      COPD (chronic obstructive pulmonary disease) (H) \"for years\"     Coronary artery disease     states \"I have a couple blockages, but they are not doing anything about that until this cancer stuff is taken care of\"     GERD (gastroesophageal reflux disease) \"10-15 years\"     Hypertension \"many years\"     Lung cancer (H) 1/2014    \"spots on my adrenal and liver\"     Metastatic cancer (H)      Pancreatic cancer (H)     \"States there may have been a spot on my pancreas too\"     Substance abuse (H) no use X 10 years    drug use was marijuana         Past Surgical History:   Procedure Laterality Date     HAND SURGERY Bilateral 10-12 yeasrs ago    bones removed and tendon transfer for arthritis     KNEE ARTHROSCOPY Left 20 years ago    \"took cartilidge out\"     " LUNG SURGERY  biopsy in 3/2014       Physical Exam    Recent Vitals 2/8/2019   Height -   Weight 189 lbs   BSA (m2) 2.07 m2   /80   Pulse 84   Temp 97.9   Temp src 1   SpO2 96   Some recent data might be hidden       GENERAL: Alert and oriented. Seated comfortably. In no distress.    HEAD: Atraumatic and normocephalic.  Has a full head of hair.    EYES: ANA, EOMI.  No pallor.  No icterus.    Oral cavity: no mucosal lesion or tonsillar enlargement.    NECK: supple. JVP normal.  No thyroid enlargement.    LYMPH NODES: No palpable, cervical, axillary or inguinal lymphadenopathy.    CHEST: Decreased breath sounds bilaterally.  Resonant to percussion throughout bilaterally.  Symmetrical breath movements bilaterally.    CVS: S1 and S2 are heard. Regular rate and rhythm.  No murmur or gallop or rub heard.    ABDOMEN: Soft. Not tender. Not distended.  No palpable hepatomegaly or splenomegaly.  No other mass palpable.  Bowel sounds heard.    EXTREMITIES: Warm.  No peripheral edema.    SKIN: no rash, or bruising or purpura.    CNS: Nonfocal        Lab Results    Recent Results (from the past 168 hour(s))   Comprehensive Metabolic Panel   Result Value Ref Range    Sodium 138 136 - 145 mmol/L    Potassium 4.2 3.5 - 5.0 mmol/L    Chloride 103 98 - 107 mmol/L    CO2 30 22 - 31 mmol/L    Anion Gap, Calculation 5 5 - 18 mmol/L    Glucose 82 70 - 125 mg/dL    BUN 14 8 - 22 mg/dL    Creatinine 0.81 0.70 - 1.30 mg/dL    GFR MDRD Af Amer >60 >60 mL/min/1.73m2    GFR MDRD Non Af Amer >60 >60 mL/min/1.73m2    Bilirubin, Total 0.4 0.0 - 1.0 mg/dL    Calcium 9.5 8.5 - 10.5 mg/dL    Protein, Total 6.5 6.0 - 8.0 g/dL    Albumin 3.8 3.5 - 5.0 g/dL    Alkaline Phosphatase 98 45 - 120 U/L    AST 18 0 - 40 U/L    ALT 26 0 - 45 U/L   HM1 (CBC with Diff)   Result Value Ref Range    WBC 4.7 4.0 - 11.0 thou/uL    RBC 5.00 4.40 - 6.20 mill/uL    Hemoglobin 14.6 14.0 - 18.0 g/dL    Hematocrit 43.1 40.0 - 54.0 %    MCV 86 80 - 100 fL    MCH  29.2 27.0 - 34.0 pg    MCHC 33.9 32.0 - 36.0 g/dL    RDW 15.2 (H) 11.0 - 14.5 %    Platelets 213 140 - 440 thou/uL    MPV 9.4 8.5 - 12.5 fL    Neutrophils % 59 50 - 70 %    Lymphocytes % 25 20 - 40 %    Monocytes % 13 (H) 2 - 10 %    Eosinophils % 2 0 - 6 %    Basophils % 1 0 - 2 %    Neutrophils Absolute 2.7 2.0 - 7.7 thou/uL    Lymphocytes Absolute 1.2 0.8 - 4.4 thou/uL    Monocytes Absolute 0.6 0.0 - 0.9 thou/uL    Eosinophils Absolute 0.1 0.0 - 0.4 thou/uL    Basophils Absolute 0.0 0.0 - 0.2 thou/uL       Imaging    No results found.      Signed by: Mariely Garcia MD